# Patient Record
Sex: FEMALE | Race: WHITE | HISPANIC OR LATINO | Employment: FULL TIME | URBAN - METROPOLITAN AREA
[De-identification: names, ages, dates, MRNs, and addresses within clinical notes are randomized per-mention and may not be internally consistent; named-entity substitution may affect disease eponyms.]

---

## 2021-08-01 ENCOUNTER — NURSE TRIAGE (OUTPATIENT)
Dept: NURSING | Facility: CLINIC | Age: 41
End: 2021-08-01

## 2021-08-01 ENCOUNTER — OFFICE VISIT (OUTPATIENT)
Dept: URGENT CARE | Facility: URGENT CARE | Age: 41
End: 2021-08-01
Payer: MEDICAID

## 2021-08-01 VITALS
TEMPERATURE: 99.3 F | RESPIRATION RATE: 21 BRPM | OXYGEN SATURATION: 97 % | SYSTOLIC BLOOD PRESSURE: 136 MMHG | WEIGHT: 274 LBS | HEART RATE: 107 BPM | DIASTOLIC BLOOD PRESSURE: 92 MMHG

## 2021-08-01 DIAGNOSIS — T19.2XXA FOREIGN BODY IN VAGINA, INITIAL ENCOUNTER: Primary | ICD-10-CM

## 2021-08-01 PROCEDURE — 99203 OFFICE O/P NEW LOW 30 MIN: CPT | Performed by: FAMILY MEDICINE

## 2021-08-01 RX ORDER — LISINOPRIL/HYDROCHLOROTHIAZIDE 10-12.5 MG
1 TABLET ORAL DAILY
COMMUNITY
Start: 2021-02-12

## 2021-08-01 RX ORDER — ALBUTEROL SULFATE 90 UG/1
AEROSOL, METERED RESPIRATORY (INHALATION)
COMMUNITY
Start: 2020-03-14

## 2021-08-01 RX ORDER — DIPHENHYD/PHENYLEPH/ACETAMINOP 12.5-5-325
TABLET ORAL
COMMUNITY
Start: 2020-05-11

## 2021-08-01 RX ORDER — ACETAMINOPHEN AND CODEINE PHOSPHATE 120; 12 MG/5ML; MG/5ML
0.35 SOLUTION ORAL DAILY
COMMUNITY

## 2021-08-01 NOTE — PROGRESS NOTES
Assessment & Plan     Foreign body in vagina, initial encounter    - REMOVE FOREIGN BODY SIMPLE    Condom removed intact today.  Advised to Follow-up prn.    Blanca Narayanan MD  [facility] MAUREEN Skinner is a 40 year old who presents for the following health issues     HPI   Had sex this afternoon using a condom with a male partner.  Condom could not be located after sex.  No pain, no discharge.    Review of Systems   Constitutional, HEENT, cardiovascular, pulmonary, GI, , musculoskeletal, neuro, skin, endocrine and psych systems are negative, except as otherwise noted.      Objective    BP (!) 136/92   Pulse 107   Temp 99.3  F (37.4  C) (Tympanic)   Resp 21   Wt 124.3 kg (274 lb)   LMP 07/07/2021 (Approximate)   SpO2 97%   There is no height or weight on file to calculate BMI.  Physical Exam   GENERAL: healthy, alert and no distress  EYES: Eyes grossly normal to inspection, PERRL and conjunctivae and sclerae normal  NECK: no adenopathy, no asymmetry, masses, or scars and thyroid normal to palpation   (female): normal female external genitalia, normal urethral meatus, vaginal mucosa, normal cervix/adnexa/uterus without masses or discharge, condom located up near os, removed intact   MS: no gross musculoskeletal defects noted, no edema  PSYCH: mentation appears normal, affect normal/bright

## 2021-08-01 NOTE — TELEPHONE ENCOUNTER
Patient says she might have a condom stuck in her vagina after sex. Patient reports she can't find the condom.    Disposition: be seen w/i 4 hrs  Reviewed care advice with caller per RN triage protocol guideline.  Advised to call back with worsening symptoms, concerns or questions. Caller verbalized understanding.          Reason for Disposition    [1] Vaginal FB AND [2] can't remove at home    Additional Information    Negative: Shock suspected (e.g., cold/pale/clammy skin, too weak to stand, low BP, rapid pulse)    Negative: Sounds like a life-threatening emergency to the triager    Negative: FB is sharp    Negative: Bleeding from the vagina  [Exception: patient denies injury and knows that the blood is from menstrual period]    Negative: FB causes pain or discomfort    Negative: Unable to urinate (can't pass urine)    Negative: Possibility of sexual assault    Negative: [1] Retained tampon AND [2] fever    Negative: [1] Retained tampon AND [2] new rash    Negative: Patient sounds very sick or weak to the triager    Protocols used: VAGINAL FOREIGN BODY-A-AH

## 2024-06-02 ENCOUNTER — HEALTH MAINTENANCE LETTER (OUTPATIENT)
Age: 44
End: 2024-06-02